# Patient Record
(demographics unavailable — no encounter records)

---

## 2024-10-10 NOTE — HISTORY OF PRESENT ILLNESS
[FreeTextEntry1] : Becki is a 13 year-old female who presents today with her follow up regarding scoliosis.  Patient was last seen in my office 10/11/2021. Only observation was recommended at that time. Patient was lost to follow. Mother reports that their pediatrician had concerns of progression of scoliosis at recent annual visit and advised family to follow up with an orthopedist. Mother reports significant growth in height since last visit. Menarche reported 2 years ago. Patient denies any recent fevers, chills, or night sweats. Denies any recent trauma or injuries. She denies any back pain, radiating pain, numbness, tingling sensations, discomfort, weakness to LE, radiating LE pain, or bladder/bowel dysfunction. She has been participating in all her normal physical activities without restrictions or discomfort. Mother denies any family history of scoliosis. Here today for further orthopedic evaluation.   The patient's HPI was reviewed thoroughly with patient and parent. The patient's parent has acted as an independent historian regarding the above information due to the unreliable nature of the history obtained from the patient.

## 2024-10-10 NOTE — REVIEW OF SYSTEMS
[NI] : Endocrine [Nl] : Hematologic/Lymphatic [Change in Activity] : no change in activity [Fever Above 102] : no fever [Malaise] : no malaise [Nosebleeds] : no epistaxis [Shortness of Breath] : no shortness of breath

## 2024-10-10 NOTE — REASON FOR VISIT
[Patient] : patient [Mother] : mother [Initial Eval - Existing Diagnosis] : an initial evaluation of an existing diagnosis [FreeTextEntry1] : scoliosis evaluation

## 2024-10-10 NOTE — DATA REVIEWED
[de-identified] : AP and lateral spine radiographs were ordered, obtained, and independently reviewed in clinic on 10/10/2024 depicting a right thoracic curve measuring 22 degrees; significant progression noted from films in 2021. Patient is Risser 4+. No obvious deformity on the lateral plane. No evidence of spondylolysis or spondylolisthesis.

## 2024-10-10 NOTE — ASSESSMENT
[FreeTextEntry1] : Becki is a 13-year-old female with adolescent idiopathic scoliosis  Today's assessment was performed with the assistance of the patient's parent as an independent historian given the patient's age. Clinical findings and x-ray results were reviewed at length with the patient and parent. We discussed at length the natural history, etiology, pathoanatomy and treatment modalities of scoliosis with patient and parent. Patient's obtained radiographs are remarkable for scoliosis with a right thoracic curve measuring 22 degrees, which has significantly progressed from previous visit in 2021.Explained to patient and parent that for curves measuring 25 degrees, a brace regimen is typically implemented for treatment. For curves of 45 degrees or more, surgical intervention is warranted. Patient is age 13 and Risser 4+. Given patient has completed their spinal growth, it is unlikely that their scoliotic curvature will continue to progress. Only observation is recommended at this time. No further orthopedic interventions were deemed necessary at this time. As for her posture, I am recommending a daily back and core strengthening exercise regimen to be implemented 4 days a week for at least 30 minutes each day. Exercise sheet was given and exercises were demonstrated during today's visit. Patient may continue participating in all physical activities without restrictions. All questions and concerns were addressed. Patient and parent vocalized understanding and agreement to assessment and treatment plan. We will plan to see Becki back in clinic in approximately 1 year for reevaluation with repeat AP and lateral scoliosis x-rays.   Natural history of spine deformity discussed. Risk of progression explained. Spine deformity can cause back pain later on and also arthritis, though usually later. Spine deformity can affect organ systems, such as lungs, less commonly heart and GI etc over time depending on curve size and progression. Deformity can progress with growth but can continue to progress later on based on the size of the curve. It can also effect patient's height due to the curve..It usually does not impact activities and has no limitations, however activities may be limited due to pain or rarely breathlessness with large curves. Scoliosis is usually not impacted by daily activities- sleeping position, sitting position, lifting heavy weights etc, however posture and back pain can be affected by some of these.Stretching, exercises, bone health and nutrition are important factors in the long run. Spine deformity may have genetics etiology and so siblings and progenies should be evaluated.For scoliosis, curves less than 25 degrees are usually managed with observation. Bracing is warranted for curves measuring greater than 25 degrees with skeletal growth remaining. Braces do not correct curves permanently and there is a 30% risk brace failure. Surgery is recommended for scoliosis measuring greater than 45 degrees.  Parent served as the primary historian regarding the above information for this visit to corroborate the patient's history. We also discussed/instructed back, core strengthening and posture correction exercises and going over the proper form as well the need to be regular on a daily basis. Importance was discussed and instructions printed.   IMeme, have acted as a scribe and documented the above information for Dr. June on 10/10/2024.   We spent 45 minutes on HPI, Clinical exam, ordering/ reviewing all imaging, reviewing any existing record, reviewing findings and counseling patient to treatment, differentials, etiology, prognosis, natural history, implications on ADLs, activities limitations/modifications, answering questions and addressing concerns, treatment goals and documenting in the EHR.

## 2024-10-10 NOTE — PHYSICAL EXAM
[FreeTextEntry1] : General: Patient is awake and alert and in no acute distress . oriented to person, place, and time. well developed, well nourished, cooperative.   Skin: The skin is intact, warm, pink, and dry over the area examined.    Eyes: normal conjunctiva, normal eyelids and pupils were equal and round.   ENT: normal ears, normal nose and normal lips.  Cardiovascular: There is brisk capillary refill in the digits of the affected extremity. They are symmetric pulses in the bilateral upper and lower extremities, positive peripheral pulses, brisk capillary refill, but no peripheral edema.  Respiratory: The patient is in no apparent respiratory distress. They're taking full deep breaths without use of accessory muscles or evidence of audible wheezes or stridor without the use of a stethoscope, normal respiratory effort.   Musculoskeletal:.  Examination of the back reveals shoulder asymmetry with right shoulder higher than left.  Right scapula is more prominent than left.  Flank asymmetry with left sided waist crease noted.  On forward bending, mild right thoracic prominence noted.  Patient is able to bend forward and touch the toes as well bend backwards without pain.  Lateral flexion is symmetrical and is pain free.  Straight leg raising test is free to more than 70 degrees.   Neurological examination reveals a grade 5/5 muscle power.  Sensation is intact to crude touch and pinprick.  Deep tendon reflexes are 1+ with ankle jerk and knee jerk.  The plantars are bilaterally down going.  Superficial abdominal reflexes are symmetric and intact.  The biceps and triceps reflexes are 1+.     There is no hairy patch, lipoma, sinus in the back.  There is no pes cavus, asymmetry of calves, significant leg length discrepancy or significant cafe-au-lait spots.  Child is able to walk on tiptoes as well as heels without difficulty or pain. Child is able to jump and squat